# Patient Record
Sex: FEMALE | Race: WHITE | NOT HISPANIC OR LATINO | Employment: OTHER
[De-identification: names, ages, dates, MRNs, and addresses within clinical notes are randomized per-mention and may not be internally consistent; named-entity substitution may affect disease eponyms.]

---

## 2017-07-08 ENCOUNTER — HEALTH MAINTENANCE LETTER (OUTPATIENT)
Age: 62
End: 2017-07-08

## 2019-11-03 ENCOUNTER — HEALTH MAINTENANCE LETTER (OUTPATIENT)
Age: 64
End: 2019-11-03

## 2020-02-10 ENCOUNTER — HEALTH MAINTENANCE LETTER (OUTPATIENT)
Age: 65
End: 2020-02-10

## 2020-11-16 ENCOUNTER — HEALTH MAINTENANCE LETTER (OUTPATIENT)
Age: 65
End: 2020-11-16

## 2021-04-03 ENCOUNTER — HEALTH MAINTENANCE LETTER (OUTPATIENT)
Age: 66
End: 2021-04-03

## 2021-09-12 ENCOUNTER — HEALTH MAINTENANCE LETTER (OUTPATIENT)
Age: 66
End: 2021-09-12

## 2021-11-07 ENCOUNTER — HEALTH MAINTENANCE LETTER (OUTPATIENT)
Age: 66
End: 2021-11-07

## 2022-04-24 ENCOUNTER — HEALTH MAINTENANCE LETTER (OUTPATIENT)
Age: 67
End: 2022-04-24

## 2022-11-19 ENCOUNTER — HEALTH MAINTENANCE LETTER (OUTPATIENT)
Age: 67
End: 2022-11-19

## 2023-07-02 ENCOUNTER — HEALTH MAINTENANCE LETTER (OUTPATIENT)
Age: 68
End: 2023-07-02

## 2024-01-05 ENCOUNTER — HOSPITAL ENCOUNTER (EMERGENCY)
Facility: OTHER | Age: 69
Discharge: HOME | End: 2024-01-06
Attending: STUDENT IN AN ORGANIZED HEALTH CARE EDUCATION/TRAINING PROGRAM | Admitting: STUDENT IN AN ORGANIZED HEALTH CARE EDUCATION/TRAINING PROGRAM
Payer: MEDICARE

## 2024-01-05 VITALS
SYSTOLIC BLOOD PRESSURE: 149 MMHG | TEMPERATURE: 98.8 F | HEART RATE: 86 BPM | DIASTOLIC BLOOD PRESSURE: 87 MMHG | RESPIRATION RATE: 16 BRPM | BODY MASS INDEX: 35.19 KG/M2 | WEIGHT: 222 LBS | OXYGEN SATURATION: 96 %
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
## 2024-01-05 DIAGNOSIS — S61.258A DOG BITE OF MIDDLE FINGER, INITIAL ENCOUNTER: ICD-10-CM
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
## 2024-01-05 DIAGNOSIS — S51.851A DOG BITE OF RIGHT FOREARM, INITIAL ENCOUNTER: ICD-10-CM

## 2024-01-05 PROCEDURE — 99283 EMERGENCY DEPT VISIT LOW MDM: CPT | Mod: 25 | Performed by: STUDENT IN AN ORGANIZED HEALTH CARE EDUCATION/TRAINING PROGRAM

## 2024-01-05 PROCEDURE — 250N000009 HC RX 250: Performed by: STUDENT IN AN ORGANIZED HEALTH CARE EDUCATION/TRAINING PROGRAM

## 2024-01-05 PROCEDURE — 99207 PR NO CHARGE LOS: CPT | Performed by: STUDENT IN AN ORGANIZED HEALTH CARE EDUCATION/TRAINING PROGRAM

## 2024-01-05 PROCEDURE — 12002 RPR S/N/AX/GEN/TRNK2.6-7.5CM: CPT | Performed by: STUDENT IN AN ORGANIZED HEALTH CARE EDUCATION/TRAINING PROGRAM

## 2024-01-05 PROCEDURE — 250N000013 HC RX MED GY IP 250 OP 250 PS 637: Performed by: STUDENT IN AN ORGANIZED HEALTH CARE EDUCATION/TRAINING PROGRAM

## 2024-01-05 RX ORDER — LIDOCAINE/RACEPINEP/TETRACAINE 4-0.05-0.5
GEL WITH PREFILLED APPLICATOR (ML) TOPICAL ONCE
Status: COMPLETED | OUTPATIENT
Start: 2024-01-05 | End: 2024-01-05

## 2024-01-05 RX ORDER — GINSENG 100 MG
500 CAPSULE ORAL ONCE
Status: COMPLETED | OUTPATIENT
Start: 2024-01-05 | End: 2024-01-05

## 2024-01-05 RX ADMIN — Medication: at 22:33

## 2024-01-05 RX ADMIN — AMOXICILLIN AND CLAVULANATE POTASSIUM 1 TABLET: 875; 125 TABLET, FILM COATED ORAL at 23:45

## 2024-01-05 RX ADMIN — BACITRACIN 1 G: 500 OINTMENT TOPICAL at 23:45

## 2024-01-05 RX ADMIN — LIDOCAINE HYDROCHLORIDE 10 ML: 10 INJECTION, SOLUTION EPIDURAL; INFILTRATION; INTRACAUDAL; PERINEURAL at 23:45

## 2024-01-05 ASSESSMENT — ACTIVITIES OF DAILY LIVING (ADL): ADLS_ACUITY_SCORE: 37

## 2024-01-06 NOTE — ED TRIAGE NOTES
Pt arrive with a dog bite to her left arm that happened about 45 minutes prior to arrival. It is a family dog and is up to date on shots. Pt CMS to [facility] WDL.  BP (!) 149/87   Pulse 86   Temp 98.8  F (37.1  C) (Tympanic)   Resp 16   Wt 100.7 kg (222 lb)   SpO2 96%   BMI 35.19 kg/m        Triage Assessment (Adult)       Row Name 01/05/24 6746          Triage Assessment    Airway WDL WDL        Respiratory WDL    Respiratory WDL WDL        Skin Circulation/Temperature WDL    Skin Circulation/Temperature WDL X        Cardiac WDL    Cardiac WDL WDL        Peripheral/Neurovascular WDL    Peripheral Neurovascular WDL WDL        Cognitive/Neuro/Behavioral WDL    Cognitive/Neuro/Behavioral WDL WDL

## 2024-01-06 NOTE — DISCHARGE INSTRUCTIONS
Keep wound dry for 24 hours and then wash normally with soap and water. Dry gently and avoid disrupting sutures. Avoid dirty environments. Apply bacitracin (topical antibiotic) to wound with each dressing change for first 3-5 days.     Examine for signs of infection (white/green/yellow discharge, worsening redness, inflammation or pain) daily and return to emergency department if any of these are present.     Get 5 sutures removed in 7 days with your PCP.     Minimize flexing right 3rd finger.    Alternate tylenol (can take up to 3000 mg in 24 hour period) and ibuprofen (can take up to 2400 mg in 24 hour period) for pain control as needed. Apply ice regularly over next 2 days to help reduce swelling. Wounds can take up to 1 year to reach their final long-term appearance.

## 2024-01-06 NOTE — ED PROVIDER NOTES
History     Chief Complaint   Patient presents with    Dog Bite       Carolyn Hill is a 68 year old female presenting with dog bite wounds.  1 hour prior to arrival patient was bit by son's dog in her forearm and right middle finger.  Dog is up-to-date on vaccinations.  Patient's tetanus up-to-date.  Denies any numbness or weakness or other injuries.      Allergies   Allergen Reactions    Nkda [No Known Drug Allergy]     Ragweeds Other (See Comments) and Cough     Burning eyes, Nose congestion         Patient Active Problem List    Diagnosis Date Noted    Encounter for antineoplastic chemotherapy 12/18/2013     Priority: Medium    Encounter for long-term current use of medication 10/28/2013     Priority: Medium     Problem list name updated by automated process. Provider to review      Metastatic melanoma (H) 07/25/2013     Priority: Medium    Bone metastases 05/22/2013     Priority: Medium    Vertigo 03/11/2013     Priority: Medium    Increased blood pressure (not hypertension) 03/11/2013     Priority: Medium    Advance care planning 02/18/2013     Priority: Medium     Patient states has Advance Directive and will bring in a copy to clinic. 2/18/2013       Secondary malignant neoplasm of liver (H) 09/05/2012     Priority: Medium    Post chemo evaluation 08/08/2012     Priority: Medium    RLS (restless legs syndrome) 09/28/2009     Priority: Medium    Melanoma (H) 07/09/2009     Priority: Medium       Past Medical History:   Diagnosis Date    Fracture 1999    Hypothyroidism     Melanoma (H) 7/9/2009    PONV (postoperative nausea and vomiting)     Rash and nonspecific skin eruption     RLS (restless legs syndrome)     Vertigo        Past Surgical History:   Procedure Laterality Date    BIOPSY NODE SENTINEL      COLONOSCOPY  2009    COSMETIC SURGERY  2/2/2009    facial reconstruction after melanoma removal    CT LIVER ABLATION (RF)  2/14/11    liver met    Hepatic chemoembolization  2/22/12    ORTHOPEDIC  SURGERY  1963    fractured leg       Family History   Problem Relation Age of Onset    Cancer Maternal Grandmother         colon    Cancer Paternal Grandfather         unknown    Cancer Mother 70        colon       Social History     Tobacco Use    Smoking status: Former     Packs/day: 0.50     Years: 12.00     Additional pack years: 0.00     Total pack years: 6.00     Types: Cigarettes     Quit date: 1987     Years since quittin.0    Smokeless tobacco: Never   Substance Use Topics    Alcohol use: Yes     Comment: rarely    Drug use: No       Medications:    acetaminophen (TYLENOL) 325 MG tablet  albuterol (ALBUTEROL) 108 (90 BASE) MCG/ACT inhaler  gabapentin (NEURONTIN) 100 MG capsule  levothyroxine (SYNTHROID, LEVOTHROID) 50 MCG tablet  loratadine (CLARITIN) 10 MG tablet  ORDER FOR DME  traZODone (DESYREL) 50 MG tablet        Review of Systems: See HPI for pertinent negatives and positives. All other systems reviewed and found to be negative.    Physical Exam   BP (!) 149/87   Pulse 86   Temp 98.8  F (37.1  C) (Tympanic)   Resp 16   Wt 100.7 kg (222 lb)   SpO2 96%   BMI 35.19 kg/m       General: awake, comfortable  HEENT: atraumatic  Respiratory: normal effort  Cardiovascular: Right third finger cap refill less than 2 seconds  Extremities: no deformities, edema, or tenderness  MSK: Normal right wrist and middle finger flexion/extension  Skin: 3 cm C-shaped laceration over the right middle finger dorsal surface DIP joint, 3 cm linear laceration over the ventral surface of right middle finger, superficial skin punctures and skin tear over right ventral forearm, explored to depth without sign of foreign body  Neuro: alert, distal sensation intact    ED Course           No results found for this or any previous visit (from the past 24 hour(s)).    Medications   lidocaine 1 % injection 10 mL (has no administration in time range)   amoxicillin-clavulanate (AUGMENTIN) 875-125 MG per tablet 1 tablet (has no  administration in time range)   Lido-Racepinephrine-Tetracaine (LET) topical gel GEL ( Topical $Given 1/5/24 0119)       Laceration Repair Procedure Note  Verbal consent - obtained  Wound site and length in cms - See physical exam above  Sensory - Intact to light touch  Motor - Intact  Tendon Injury - No  Anesthetic - 6 ml of lidocaine without epinephrine  Irrigation - Performed with normal saline  Debridement - None  Suture - 5-0 monofilament  Number of sutures - 5 (2 dorsal, 3 ventral)  Complications - None, the patient tolerated this repair well with no complications  This was Simple laceration repair.   SR 7 days    [facility]    -Laceration Repair    Date/Time: 1/5/2024 11:37 PM    Performed by: Alex Brownlee MD  Authorized by: Alex Brownlee MD    Risks, benefits and alternatives discussed.      ANESTHESIA (see MAR for exact dosages):     Anesthesia method:  Local infiltration    Local anesthetic:  Lidocaine 1% w/o epi  LACERATION DETAILS     Location:  Finger    Finger location:  R long finger    REPAIR TYPE:     Repair type:  Simple      TREATMENT:     Area cleansed with:  Saline    Amount of cleaning:  Standard    Irrigation solution:  Sterile saline    Visualized foreign bodies/material removed: no      SKIN REPAIR     Repair method:  Sutures    Suture size:  5-0    Suture technique:  Simple interrupted    APPROXIMATION     Approximation:  Close    PROCEDURE    Patient Tolerance:  Patient tolerated the procedure well with no immediate complications      Assessments & Plan (with Medical Decision Making)     I have reviewed the nursing notes.    Right forearm superficial puncture and skin tear as well as right third finger ventral/dorsal surface lacerations secondary to dog bite.  Dog vaccinated.  Tetanus up-to-date. Laceration explored to its full depth and there is low suspicion of significant injury to underlying soft tissue or bony structures. CMS intact. Based on history and  exam, an x-ray was not indicated to evaluate for retained foreign body or bone involvement. Tetanus did not require updating.  Laceration repair performed is 1 wound was over the joint space with concern for edges not meeting approximation for healing, as well as ventral laceration with no tissue approximation and concern for impaired healing as well.  Minimal sutures used in order to allow drainage secondary to dog bite wound.  Laceration repaired per above. Wound care instructions, including use of antibiotic ointment, cleaning with soap and water, avoidance of prolonged submersion or dirty environments, ED return precautions, and suture removal provided.     I have reviewed the findings, diagnosis, plan and need for follow up with the patient.    New Prescriptions    No medications on file       Final diagnoses:   Dog bite of middle finger, initial encounter - right   Dog bite of right forearm, initial encounter       1/5/2024   [facility]       Alex Brownlee MD  01/05/24 1135

## 2024-12-29 ENCOUNTER — HEALTH MAINTENANCE LETTER (OUTPATIENT)
Age: 69
End: 2024-12-29

## (undated) RX ORDER — NEOMYCIN/BACITRACIN/POLYMYXINB 3.5-400-5K
OINTMENT (GRAM) TOPICAL
Status: DISPENSED
Start: 2024-01-05

## (undated) RX ORDER — LIDOCAINE HYDROCHLORIDE 10 MG/ML
INJECTION, SOLUTION EPIDURAL; INFILTRATION; INTRACAUDAL; PERINEURAL
Status: DISPENSED
Start: 2024-01-05

## (undated) RX ORDER — LIDOCAINE/RACEPINEP/TETRACAINE 4-0.05-0.5
GEL WITH PREFILLED APPLICATOR (ML) TOPICAL
Status: DISPENSED
Start: 2024-01-05